# Patient Record
Sex: FEMALE | Race: WHITE | Employment: OTHER | URBAN - METROPOLITAN AREA
[De-identification: names, ages, dates, MRNs, and addresses within clinical notes are randomized per-mention and may not be internally consistent; named-entity substitution may affect disease eponyms.]

---

## 2021-04-07 ENCOUNTER — OFFICE VISIT (OUTPATIENT)
Dept: DERMATOLOGY | Facility: CLINIC | Age: 46
End: 2021-04-07
Payer: MEDICARE

## 2021-04-07 DIAGNOSIS — L72.9 CYST OF SKIN: Primary | ICD-10-CM

## 2021-04-07 DIAGNOSIS — D48.5 NEOPLASM OF UNCERTAIN BEHAVIOR OF SKIN: ICD-10-CM

## 2021-04-07 PROCEDURE — 88305 TISSUE EXAM BY PATHOLOGIST: CPT | Performed by: DERMATOLOGY

## 2021-04-07 PROCEDURE — 11102 TANGNTL BX SKIN SINGLE LES: CPT | Performed by: PHYSICIAN ASSISTANT

## 2021-04-07 PROCEDURE — 99202 OFFICE O/P NEW SF 15 MIN: CPT | Mod: 25 | Performed by: PHYSICIAN ASSISTANT

## 2021-04-07 RX ORDER — LIDOCAINE HYDROCHLORIDE AND EPINEPHRINE 10; 10 MG/ML; UG/ML
3 INJECTION, SOLUTION INFILTRATION; PERINEURAL ONCE
Status: ACTIVE | OUTPATIENT
Start: 2021-04-07

## 2021-04-07 ASSESSMENT — PAIN SCALES - GENERAL: PAINLEVEL: NO PAIN (0)

## 2021-04-07 NOTE — PATIENT INSTRUCTIONS

## 2021-04-07 NOTE — NURSING NOTE
Reyna Sanchez's goals for this visit include:   Chief Complaint   Patient presents with     Consult     cyst on left jawline        She requests these members of her care team be copied on today's visit information: no    PCP: Thalia Sams    Referring Provider:  No referring provider defined for this encounter.    There were no vitals taken for this visit.    Do you need any medication refills at today's visit? N/a..Marium Hou RN

## 2021-04-07 NOTE — LETTER
4/7/2021         RE: Reyna Sanchez  [address]  [place] [address]        Dear Colleague,    Thank you for referring your patient, Reyna Sanchez, to the [facility]. Please see a copy of my visit note below.    [facility] Dermatology Note  Encounter Date: Apr 7, 2021  Office Visit     Dermatology Problem List:  1. NUB - Right popliteal fossa - s/p bx: 4/7/21  2. Calcified cyst - on left cheek, appears to be partially removed (outside clinic, unsure where) defers further treatment.  3. Necrobiosis lipoidica  ____________________________________________    Assessment & Plan:  # Cyst. L lower cheek. Appears to have been partially removed, but we are unsure where and do no have records of procedure. Patient is nonverbal and does not tolerate needles, she had to have a dental procedure done, and so it seems like this lesion on the cheek was treated at the same time. Unclear if done by dental or physician. I do not see records in chart. Previously it was recommended she have it removed by plastic surgery so that general anesthesia be utilized.  -Explained to patient benign nature of lesion. No treatment is necessary at this time unless the lesion changes or becomes symptomatic. Would defer to plastics for removal if that is the case.    # Neoplasm of uncertain behavior on the right popliteal fossa. The differential diagnosis includes neurofibroma vs other.  -See procedure note.    Procedures Performed:   After verbal consent, discussion of risks and beneftis including but not limited to bleeding, infection, scar and recurrence, lesion was prepped with alcohol, the lesion was snip clipped with scissors and submitted to pathology.The patient tolerated the procedure and no complications were noted. We elected not to use local anesthetic on this patient.      Follow-up: pending path results  Staff and Scribe:     Scribe Disclosure:   I,  Daniela Olivera, am serving as a scribe to document services personally performed by Maddy Blank PA-C, based on data collection and the provider's statements to me.    Provider Disclosure:   The documentation recorded by the scribe accurately reflects the services I personally performed and the decisions made by me.    All risks, benefits and alternatives were discussed with patient.  Patient is in agreement and understands the assessment and plan.  All questions were answered.    Maddy Blank PA-C, [facility]  [facility] [place]: Phone: [phone], Fax: [phone]  [facility]: Phone: [phone],  Fax: [phone]  ____________________________________________    CC: Consult (cyst on left jawline )    HPI:  Ms. Reyna Sanchez is a(n) 45 year old female who presents today as a new patient for a spot check.    Self referred. Patient seen 02/23/21 by PCP for a facial cyst. Recommended follow up with derm for excision/biopsy. No note of excision available, but telephone encounter from [facility] on 3/10/21 notes that patient had an upcoming surgery.    Today, patient reports lesion on left jawline. Patient is accompanied by 2 caregivers. Caregiver states lesion was removed three weeks ago by [facility] provider (possibly in dental clinic?) on lower left jaw line. Right after the procedure it bothered her. Bandages and topical medicines were used for 1 week. Patient also has lesion on back of right leg she wants examined.    Patient is otherwise feeling well, without additional concerns.    Labs:  NA    Physical Exam:  Vitals: There were no vitals taken for this visit.  SKIN: Focused examination of the face and legs was performed.  -Calcified cyst on left cheek, appears to be partially removed, well healed, no sign of infection  - Right popliteal fossa: 1.5 cm soft papule  - No other lesions of concern on areas examined.      Medications:  Current Outpatient Medications   Medication     Acetaminophen (TYLENOL PO)     carbamide peroxide (DEBROX) 6.5 % otic solution     cetirizine (ZYRTEC) 10 MG tablet     CLONAZEPAM PO     DiphenhydrAMINE HCl (BENADRYL PO)     DOCUSATE SODIUM PO     LevETIRAcetam (KEPPRA PO)     magnesium hydroxide (MILK OF MAGNESIA) 400 MG/5ML suspension     multivitamin, therapeutic (THERA-VIT) TABS     OLANZapine (ZYPREXA) 5 MG tablet     PHENOBARBITAL PO     polyethylene glycol (MIRALAX/GLYCOLAX) packet     sennosides (SENOKOT) 8.6 MG tablet     ZONISAMIDE PO     ZONISAMIDE PO     No current facility-administered medications for this visit.       Past Medical History:   There is no problem list on file for this patient.    Past Medical History:   Diagnosis Date     Autism      Mental retardation      Necrobiosis lipoidica      Seizures (H)        CC Dr. Guevara on close of this encounter.        Again, thank you for allowing me to participate in the care of your patient.        Sincerely,        Maddy Blank PA-C

## 2021-04-07 NOTE — PROGRESS NOTES
[facility] Dermatology Note  Encounter Date: Apr 7, 2021  Office Visit     Dermatology Problem List:  1. NUB - Right popliteal fossa - s/p bx: 4/7/21  2. Calcified cyst - on left cheek, appears to be partially removed (outside clinic, unsure where) defers further treatment.  3. Necrobiosis lipoidica  ____________________________________________    Assessment & Plan:  # Cyst. L lower cheek. Appears to have been partially removed, but we are unsure where and do no have records of procedure. Patient is nonverbal and does not tolerate needles, she had to have a dental procedure done, and so it seems like this lesion on the cheek was treated at the same time. Unclear if done by dental or physician. I do not see records in chart. Previously it was recommended she have it removed by plastic surgery so that general anesthesia be utilized.  -Explained to patient benign nature of lesion. No treatment is necessary at this time unless the lesion changes or becomes symptomatic. Would defer to plastics for removal if that is the case.    # Neoplasm of uncertain behavior on the right popliteal fossa. The differential diagnosis includes neurofibroma vs other.  -See procedure note.    Procedures Performed:   After verbal consent, discussion of risks and beneftis including but not limited to bleeding, infection, scar and recurrence, lesion was prepped with alcohol, the lesion was snip clipped with scissors and submitted to pathology.The patient tolerated the procedure and no complications were noted. We elected not to use local anesthetic on this patient.      Follow-up: pending path results  Staff and Scribe:     Scribe Disclosure:   I, Daniela Olivera, am serving as a scribe to document services personally performed by Maddy Blank PA-C, based on data collection and the provider's statements to me.    Provider Disclosure:   The documentation recorded by the scribe accurately reflects the services I personally  performed and the decisions made by me.    All risks, benefits and alternatives were discussed with patient.  Patient is in agreement and understands the assessment and plan.  All questions were answered.    Maddy Blank PA-C, MPAS  [facility] [place]: Phone: [phone], Fax: [phone]  [facility]: Phone: [phone],  Fax: [phone]  ____________________________________________    CC: Consult (cyst on left jawline )    HPI:  Ms. Reyna Sanchez is a(n) 45 year old female who presents today as a new patient for a spot check.    Self referred. Patient seen 02/23/21 by PCP for a facial cyst. Recommended follow up with derm for excision/biopsy. No note of excision available, but telephone encounter from [facility] on 3/10/21 notes that patient had an upcoming surgery.    Today, patient reports lesion on left jawline. Patient is accompanied by 2 caregivers. Caregiver states lesion was removed three weeks ago by [facility] provider (possibly in dental clinic?) on lower left jaw line. Right after the procedure it bothered her. Bandages and topical medicines were used for 1 week. Patient also has lesion on back of right leg she wants examined.    Patient is otherwise feeling well, without additional concerns.    Labs:  NA    Physical Exam:  Vitals: There were no vitals taken for this visit.  SKIN: Focused examination of the face and legs was performed.  -Calcified cyst on left cheek, appears to be partially removed, well healed, no sign of infection  - Right popliteal fossa: 1.5 cm soft papule  - No other lesions of concern on areas examined.     Medications:  Current Outpatient Medications   Medication     Acetaminophen (TYLENOL PO)     carbamide peroxide (DEBROX) 6.5 % otic solution     cetirizine (ZYRTEC) 10 MG tablet     CLONAZEPAM PO     DiphenhydrAMINE HCl (BENADRYL PO)     DOCUSATE SODIUM PO     LevETIRAcetam (KEPPRA  PO)     magnesium hydroxide (MILK OF MAGNESIA) 400 MG/5ML suspension     multivitamin, therapeutic (THERA-VIT) TABS     OLANZapine (ZYPREXA) 5 MG tablet     PHENOBARBITAL PO     polyethylene glycol (MIRALAX/GLYCOLAX) packet     sennosides (SENOKOT) 8.6 MG tablet     ZONISAMIDE PO     ZONISAMIDE PO     No current facility-administered medications for this visit.       Past Medical History:   There is no problem list on file for this patient.    Past Medical History:   Diagnosis Date     Autism      Mental retardation      Necrobiosis lipoidica      Seizures (H)        CC Dr. Guevara on close of this encounter.

## 2021-04-11 LAB — COPATH REPORT: NORMAL
